# Patient Record
Sex: MALE | Race: WHITE | NOT HISPANIC OR LATINO | Employment: OTHER | URBAN - METROPOLITAN AREA
[De-identification: names, ages, dates, MRNs, and addresses within clinical notes are randomized per-mention and may not be internally consistent; named-entity substitution may affect disease eponyms.]

---

## 2017-09-15 ENCOUNTER — APPOINTMENT (OUTPATIENT)
Dept: GENERAL RADIOLOGY | Facility: HOSPITAL | Age: 67
End: 2017-09-15

## 2017-09-15 ENCOUNTER — APPOINTMENT (OUTPATIENT)
Dept: CT IMAGING | Facility: HOSPITAL | Age: 67
End: 2017-09-15

## 2017-09-15 ENCOUNTER — HOSPITAL ENCOUNTER (EMERGENCY)
Facility: HOSPITAL | Age: 67
Discharge: HOME | End: 2017-09-15
Attending: EMERGENCY MEDICINE | Admitting: EMERGENCY MEDICINE

## 2017-09-15 VITALS
HEART RATE: 61 BPM | DIASTOLIC BLOOD PRESSURE: 81 MMHG | OXYGEN SATURATION: 96 % | HEIGHT: 74 IN | RESPIRATION RATE: 18 BRPM | WEIGHT: 252 LBS | BODY MASS INDEX: 32.34 KG/M2 | SYSTOLIC BLOOD PRESSURE: 124 MMHG | TEMPERATURE: 98 F

## 2017-09-15 DIAGNOSIS — S09.90XA HEAD INJURY DUE TO TRAUMA, INITIAL ENCOUNTER: ICD-10-CM
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
## 2017-09-15 DIAGNOSIS — M25.512 ACUTE PAIN OF LEFT SHOULDER: ICD-10-CM

## 2017-09-15 DIAGNOSIS — S60.229A CONTUSION OF HAND, UNSPECIFIED LATERALITY, INITIAL ENCOUNTER: ICD-10-CM

## 2017-09-15 PROCEDURE — 73130 X-RAY EXAM OF HAND: CPT

## 2017-09-15 PROCEDURE — 70450 CT HEAD/BRAIN W/O DYE: CPT

## 2017-09-15 PROCEDURE — 99284 EMERGENCY DEPT VISIT MOD MDM: CPT | Performed by: EMERGENCY MEDICINE

## 2017-09-15 PROCEDURE — 99283 EMERGENCY DEPT VISIT LOW MDM: CPT

## 2017-09-15 PROCEDURE — 73030 X-RAY EXAM OF SHOULDER: CPT

## 2017-09-15 RX ORDER — TRAMADOL HYDROCHLORIDE 50 MG/1
50 TABLET ORAL EVERY 8 HOURS PRN
Qty: 9 TABLET | Refills: 0 | Status: SHIPPED | OUTPATIENT
Start: 2017-09-15 | End: 2017-09-18

## 2017-09-15 RX ORDER — IBUPROFEN 400 MG/1
TABLET ORAL
Status: DISCONTINUED
Start: 2017-09-15 | End: 2017-09-15 | Stop reason: HOSPADM

## 2017-09-15 RX ORDER — PHENOL 1.4 %
600 AEROSOL, SPRAY (ML) MUCOUS MEMBRANE 2 TIMES DAILY WITH MEALS
COMMUNITY

## 2017-09-15 RX ORDER — IBUPROFEN 400 MG/1
800 TABLET ORAL ONCE
Status: COMPLETED | OUTPATIENT
Start: 2017-09-15 | End: 2017-09-15

## 2017-09-15 RX ADMIN — IBUPROFEN 800 MG: 400 TABLET ORAL at 21:23
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
## 2017-09-16 NOTE — DISCHARGE INSTRUCTIONS
Rest.  Apply ice to affected areas.  Please return to the emergency room for any worsening pain, weakness, numbness, swelling or any other concerns.

## 2017-09-16 NOTE — ED NOTES
Right hand bright red and swollen. Radial pulses palpable bilaterally.     Gia Song RN  09/15/17 2006

## 2017-09-16 NOTE — ED NOTES
Pt called out twice for pain medicine for his headache.  Dr. Farris in to talk to patient.     Gia Song RN  09/15/17 0784

## 2019-01-07 ENCOUNTER — APPOINTMENT (OUTPATIENT)
Dept: GENERAL RADIOLOGY | Facility: HOSPITAL | Age: 69
End: 2019-01-07

## 2019-01-07 ENCOUNTER — HOSPITAL ENCOUNTER (EMERGENCY)
Facility: HOSPITAL | Age: 69
Discharge: HOME | End: 2019-01-07
Attending: EMERGENCY MEDICINE | Admitting: EMERGENCY MEDICINE

## 2019-01-07 VITALS
BODY MASS INDEX: 20.28 KG/M2 | OXYGEN SATURATION: 98 % | HEIGHT: 74 IN | SYSTOLIC BLOOD PRESSURE: 154 MMHG | DIASTOLIC BLOOD PRESSURE: 83 MMHG | TEMPERATURE: 97.6 F | RESPIRATION RATE: 18 BRPM | WEIGHT: 158 LBS | HEART RATE: 67 BPM

## 2019-01-07 DIAGNOSIS — S20.212A CHEST WALL CONTUSION, LEFT, INITIAL ENCOUNTER: Primary | ICD-10-CM

## 2019-01-07 DIAGNOSIS — S40.012A CONTUSION OF LEFT SHOULDER, INITIAL ENCOUNTER: ICD-10-CM

## 2019-01-07 PROCEDURE — 71101 X-RAY EXAM UNILAT RIBS/CHEST: CPT

## 2019-01-07 PROCEDURE — 99283 EMERGENCY DEPT VISIT LOW MDM: CPT

## 2019-01-07 PROCEDURE — 99282 EMERGENCY DEPT VISIT SF MDM: CPT | Performed by: EMERGENCY MEDICINE

## 2019-01-07 PROCEDURE — 73030 X-RAY EXAM OF SHOULDER: CPT

## 2019-01-07 RX ORDER — HYDROCODONE BITARTRATE AND ACETAMINOPHEN 5; 325 MG/1; MG/1
TABLET ORAL
Qty: 15 TABLET | Refills: 0 | Status: SHIPPED | OUTPATIENT
Start: 2019-01-07 | End: 2019-01-07 | Stop reason: SDUPTHER

## 2019-01-07 RX ORDER — HYDROCODONE BITARTRATE AND ACETAMINOPHEN 5; 325 MG/1; MG/1
TABLET ORAL
Qty: 15 TABLET | Refills: 0 | OUTPATIENT
Start: 2019-01-07 | End: 2019-03-17

## 2019-01-07 NOTE — ED PROVIDER NOTES
EMERGENCY DEPARTMENT ENCOUNTER      Room Number: 8/08      HPI:    Chief complaint: Left lateral thorax and left shoulder pain status post fall    Location: As above    Quality/Severity:  Mild to moderate     Timing/Duration: Recurred abruptly one week ago    Modifying Factors: Worse with rotation of thorax, range of motion left shoulder    Associated Symptoms: Denies dyspnea and hemoptysis.  No swelling or bruising.  Focal numbness or weakness    Narrative: Pt is a 68 y.o. male who presents complaining of left lateral thorax and left shoulder pain status post fall.  Patient reports mechanical fall after tripping on a curb 1 week ago.  Discomfort continues.  He denies dyspnea and hemoptysis.      [facility]: [facility]    REVIEW OF SYSTEMS  Review of Systems  He did bump his left great toe as well but that pain is resolving.  No other complaints.  No hematuria or dysuria.  No black or bloody stools.  No change in appetite.  No abdominal pain.  No fevers.  All other systems reviewed are otherwise negative as related chief complaint    PAST MEDICAL HISTORY  Active Ambulatory Problems     Diagnosis Date Noted   • No Active Ambulatory Problems     Resolved Ambulatory Problems     Diagnosis Date Noted   • No Resolved Ambulatory Problems     Past Medical History:   Diagnosis Date   • GERD (gastroesophageal reflux disease)    • Heart disease    • HIV (human immunodeficiency virus infection) (CMS/[facility])        PAST SURGICAL HISTORY  Past Surgical History:   Procedure Laterality Date   • CARPAL TUNNEL RELEASE     • PACEMAKER IMPLANTATION         FAMILY HISTORY  History reviewed. No pertinent family history.    SOCIAL HISTORY  Social History     Socioeconomic History   • Marital status: Unknown     Spouse name: Not on file   • Number of children: Not on file   • Years of education: Not on file   • Highest education level: Not on file   Social Needs   • Financial resource strain: Not on file   • Food insecurity - worry: Not on  file   • Food insecurity - inability: Not on file   • Transportation needs - medical: Not on file   • Transportation needs - non-medical: Not on file   Occupational History   • Not on file   Tobacco Use   • Smoking status: Never Smoker   Substance and Sexual Activity   • Alcohol use: No   • Drug use: Yes     Types: Marijuana     Comment: occational    • Sexual activity: Not on file   Other Topics Concern   • Not on file   Social History Narrative   • Not on file       ALLERGIES  Patient has no known allergies.    PHYSICAL EXAM  ED Triage Vitals    Temp Heart Rate Resp BP SpO2   97.6 °F (36.4 °C) 67 18 154/83 98 %      Temp src Heart Rate Source Patient Position BP Location FiO2 (%)   Oral Monitor Sitting Right arm --         Physical Exam   Constitutional: He is oriented to person, place, and time and well-developed, well-nourished, and in no distress. No distress.   HENT:   Head: Normocephalic.   Mucous membranes moist   Eyes: No scleral icterus.   Neck:   Painless range of motion   Cardiovascular: Normal rate and regular rhythm.   Pulmonary/Chest: Effort normal and breath sounds normal. No respiratory distress.           Abdominal: Soft. There is no tenderness.   Musculoskeletal:        Arms:       Feet:    Moves all extremities equally   Neurological: He is alert and oriented to person, place, and time.   Skin: Skin is warm and dry.   Psychiatric: Mood and affect normal.   Nursing note and vitals reviewed.      LAB RESULTS        I ordered the above labs and reviewed the results    RADIOLOGY  Xr Ribs Left With Pa Chest    Result Date: 1/7/2019  Narrative: CHEST AND LEFT RIB SERIES, 1/7/2019     HISTORY: 68-year-old male in the ED complaining of left shoulder and left chest wall pain after a fall 6 days ago.  TECHNIQUE: PA upright chest x-ray with 4-view left rib series.  FINDINGS: No acute rib fracture is identified.  The lungs are expanded and clear. No visible pneumothorax, or contusion or pleural effusion.   Heart size and pulmonary vascularity are normal. Dual-chamber cardiac pacemaker.      Impression: Negative chest and left rib series.  This report was finalized on 1/7/2019 4:48 PM by Dr. Ar Dhillon MD.      Xr Shoulder 2+ View Left    Result Date: 1/7/2019  Narrative: LEFT SHOULDER, 1/7/2019     HISTORY: 68-year-old male in the ED complaining of left shoulder and left chest wall pain after a fall 6 days ago.  TECHNIQUE: Two view left shoulder series.  FINDINGS: The examination is negative. No fracture, dislocation, arthropathy or other osseous abnormality is demonstrated.      Impression: Negative left shoulder.  This report was finalized on 1/7/2019 4:46 PM by Dr. Ar Dhillon MD.        I ordered the above radiologic testing and reviewed the results    PROCEDURES  Procedures      PROGRESS AND CONSULTS           MEDICAL DECISION MAKING  Results were reviewed/discussed with the patient and they were also made aware of online access. Pt also made aware that some labs, such as cultures, will not be resulted during ER visit and follow up with PMD is necessary.     MDM     Jamari query complete. Treatment plan to include limited course of prescribed controlled substance.  Risks including addiction, tolerance, sedation, benefits and alternatives presented to patient.  DIAGNOSIS  Final diagnoses:   Chest wall contusion, left, initial encounter   Contusion of left shoulder, initial encounter       Latest Documented Vital Signs:  As of 5:01 PM  BP- 154/83 HR- 67 Temp- 97.6 °F (36.4 °C) (Oral) O2 sat- 98%    DISPOSITION  Discharge-stable       Medication List      New Prescriptions    HYDROcodone-acetaminophen 5-325 MG per tablet  Commonly known as:  NORCO  Take 1 tab by mouth every 4-6 hours as needed for pain          Follow-up Information     [facility]. Schedule an appointment as soon as possible for a visit in 1 week.    Why:  For wound re-check                      Mandeep Quintero MD  01/07/19  [id number]

## 2019-01-07 NOTE — ED NOTES
Pt complains of left rib pain, left shoulder pain, and left toe pain.  No bruising, swelling or trauma noted to any of those areas.     Gia Song, RN  01/07/19 4129

## 2019-03-17 ENCOUNTER — HOSPITAL ENCOUNTER (EMERGENCY)
Facility: HOSPITAL | Age: 69
Discharge: HOME | End: 2019-03-17
Attending: EMERGENCY MEDICINE | Admitting: EMERGENCY MEDICINE

## 2019-03-17 ENCOUNTER — APPOINTMENT (OUTPATIENT)
Dept: GENERAL RADIOLOGY | Facility: HOSPITAL | Age: 69
End: 2019-03-17

## 2019-03-17 VITALS
BODY MASS INDEX: 34.27 KG/M2 | TEMPERATURE: 97.8 F | DIASTOLIC BLOOD PRESSURE: 83 MMHG | SYSTOLIC BLOOD PRESSURE: 119 MMHG | HEART RATE: 74 BPM | OXYGEN SATURATION: 100 % | HEIGHT: 72 IN | RESPIRATION RATE: 14 BRPM | WEIGHT: 253 LBS

## 2019-03-17 DIAGNOSIS — S70.01XA CONTUSION OF RIGHT HIP, INITIAL ENCOUNTER: Primary | ICD-10-CM

## 2019-03-17 PROCEDURE — 73502 X-RAY EXAM HIP UNI 2-3 VIEWS: CPT

## 2019-03-17 PROCEDURE — 99283 EMERGENCY DEPT VISIT LOW MDM: CPT

## 2019-03-17 PROCEDURE — 99282 EMERGENCY DEPT VISIT SF MDM: CPT | Performed by: EMERGENCY MEDICINE

## 2019-03-17 RX ORDER — TRAMADOL HYDROCHLORIDE 50 MG/1
50 TABLET ORAL ONCE
Status: COMPLETED | OUTPATIENT
Start: 2019-03-17 | End: 2019-03-17

## 2019-03-17 RX ORDER — ASPIRIN 81 MG/1
81 TABLET, CHEWABLE ORAL DAILY
COMMUNITY

## 2019-03-17 RX ORDER — TRAMADOL HYDROCHLORIDE 50 MG/1
TABLET ORAL
Qty: 20 TABLET | Refills: 0 | Status: SHIPPED | OUTPATIENT
Start: 2019-03-17

## 2019-03-17 RX ORDER — TRAMADOL HYDROCHLORIDE 50 MG/1
TABLET ORAL
Status: COMPLETED
Start: 2019-03-17 | End: 2019-03-17

## 2019-03-17 RX ADMIN — TRAMADOL HYDROCHLORIDE 50 MG: 50 TABLET ORAL at 02:39

## 2019-03-17 RX ADMIN — TRAMADOL HYDROCHLORIDE 50 MG: 50 TABLET, FILM COATED ORAL at 02:39

## 2019-03-17 NOTE — ED NOTES
Patient states that he was in the shower and bent over to wash his legs and he fell, hurting his right flank.  Patient ambulatory with assist of cane.  Patient able to sit on bed and put bilat legs on bed without difficulty     Rita Wei, RN  03/17/19 0139       Rita Wei, RN  03/17/19 0141

## 2019-03-17 NOTE — ED PROVIDER NOTES
Subjective   History of Present Illness  History of Present Illness    Chief complaint: Fall with hip and pelvis pain    Location: Right hip and right pelvis    Quality/Severity: Moderate    Timing/Duration: Fall occurred approximately 27 hours ago    Modifying Factors: Patient slipped in bathtub    Associated Symptoms: Pain worse with ambulation    Narrative: The patient is a 68-year-old white male who presents as noted above.  The patient relates that late on the evening of March 15 he slipped in in a bathtub and landed hard on his right hip and pelvis.  Since that time he has been having significant pain and difficulty with ambulation.    Review of Systems   Constitutional: Negative for activity change, appetite change, fatigue and fever.   HENT: Positive for trouble swallowing (Scheduled for esophageal dilatation).    Musculoskeletal: Positive for arthralgias (Right hip and pelvis), gait problem (Since injury) and neck pain (Chronic).   All other systems reviewed and are negative.      Past Medical History:   Diagnosis Date   • GERD (gastroesophageal reflux disease)    • Heart disease    • HIV (human immunodeficiency virus infection) (CMS/[facility])        No Known Allergies    Past Surgical History:   Procedure Laterality Date   • CARPAL TUNNEL RELEASE     • PACEMAKER IMPLANTATION         History reviewed. No pertinent family history.    Social History     Socioeconomic History   • Marital status: Unknown     Spouse name: Not on file   • Number of children: Not on file   • Years of education: Not on file   • Highest education level: Not on file   Tobacco Use   • Smoking status: Never Smoker   Substance and Sexual Activity   • Alcohol use: No   • Drug use: Yes     Types: Marijuana     Comment: occational            Objective   Physical Exam   Constitutional: He is oriented to person, place, and time.   The patient is a moderately obese, 68-year-old, white male in no acute distress.   HENT:   Head: Normocephalic and  atraumatic.   Eyes: Conjunctivae and EOM are normal.   Musculoskeletal: He exhibits edema (1+ bilaterally).   Tenderness over the right pelvis and buttocks.  Mild tenderness also noted in the right hip with range of motion.  Neuromuscular vascular exams intact in both lower extremities.   Neurological: He is alert and oriented to person, place, and time.   Psychiatric: He has a normal mood and affect.   Nursing note and vitals reviewed.      Procedures         Final diagnoses:   Contusion of right hip, initial encounter         ED Course  ED Course as of Mar 17 0241   Sun Mar 17, 2019   0224 No acute findings evident on x-ray.  Treatment plan, expectations and warnings discussed with patient.  [ML]      ED Course User Index  [ML] Jeferson Mack MD                  MDM  Number of Diagnoses or Management Options  Contusion of right hip, initial encounter: new and requires workup     Amount and/or Complexity of Data Reviewed  Tests in the radiology section of CPT®: ordered and reviewed  Independent visualization of images, tracings, or specimens: yes    Risk of Complications, Morbidity, and/or Mortality  Presenting problems: moderate  Diagnostic procedures: moderate  Management options: high    Patient Progress  Patient progress: stable    Labs this visit  Lab Results (last 24 hours)     ** No results found for the last 24 hours. **        Prescribed on discharge             Medication List      New Prescriptions    traMADol 50 MG tablet  Commonly known as:  ULTRAM  1 or 2 tablets every 6 hours as needed for pain        Stop    HYDROcodone-acetaminophen 5-325 MG per tablet  Commonly known as:  NORCO          All lab results, imaging results and other tests were reviewed by Jeferson Mack MD and unless otherwise specified were found to be unremarkable.      Final diagnoses:   Contusion of right hip, initial encounter            Jeferson Mack MD  03/17/19 0242